# Patient Record
Sex: MALE | Race: BLACK OR AFRICAN AMERICAN | NOT HISPANIC OR LATINO | Employment: FULL TIME | URBAN - METROPOLITAN AREA
[De-identification: names, ages, dates, MRNs, and addresses within clinical notes are randomized per-mention and may not be internally consistent; named-entity substitution may affect disease eponyms.]

---

## 2017-07-03 ENCOUNTER — HOSPITAL ENCOUNTER (EMERGENCY)
Facility: HOSPITAL | Age: 52
Discharge: HOME | End: 2017-07-03
Attending: EMERGENCY MEDICINE | Admitting: EMERGENCY MEDICINE

## 2017-07-03 ENCOUNTER — APPOINTMENT (OUTPATIENT)
Dept: CT IMAGING | Facility: HOSPITAL | Age: 52
End: 2017-07-03

## 2017-07-03 VITALS
WEIGHT: 165 LBS | SYSTOLIC BLOOD PRESSURE: 138 MMHG | OXYGEN SATURATION: 99 % | RESPIRATION RATE: 20 BRPM | TEMPERATURE: 97.9 F | HEART RATE: 68 BPM | HEIGHT: 72 IN | BODY MASS INDEX: 22.35 KG/M2 | DIASTOLIC BLOOD PRESSURE: 88 MMHG

## 2017-07-03 DIAGNOSIS — H21.01 HYPHEMA, RIGHT: Primary | ICD-10-CM

## 2017-07-03 PROCEDURE — 99283 EMERGENCY DEPT VISIT LOW MDM: CPT

## 2017-07-03 PROCEDURE — 70480 CT ORBIT/EAR/FOSSA W/O DYE: CPT

## 2017-07-03 RX ORDER — HYDROCODONE BITARTRATE AND ACETAMINOPHEN 7.5; 325 MG/1; MG/1
1 TABLET ORAL ONCE
Status: COMPLETED | OUTPATIENT
Start: 2017-07-03 | End: 2017-07-03

## 2017-07-03 RX ADMIN — HYDROCODONE BITARTRATE AND ACETAMINOPHEN 1 TABLET: 7.5; 325 TABLET ORAL at 15:43

## 2017-07-03 NOTE — DISCHARGE INSTRUCTIONS
Do not eat or drink.  Keep shield on eye.  Have someone drive you immediately to Dr. Bolaños office at the above address.

## 2017-07-03 NOTE — ED PROVIDER NOTES
Subjective   HPI Comments: 52-year-old black male complaining of right eye injury.  Patient works for [employer] and was moving a box when something fell out of the box and struck him in the right eye.  He was wearing glasses at the time, but there was an intense force against his right eye.  Since that time he can see bright light as well as make out shapes and shadows but no other specific visual acuity.  Patient denies any other complaints, loss consciousness, neck pain, or other concerns.    Patient is a 52 y.o. male presenting with eye problem.   History provided by:  Patient  Eye Problem   Location:  Right eye  Quality:  Dull  Severity:  Moderate  Onset quality:  Gradual  Timing:  Constant  Progression:  Worsening  Chronicity:  New  Relieved by:  Nothing  Worsened by:  Nothing  Ineffective treatments:  None tried  Associated symptoms: blurred vision, decreased vision, swelling and tearing    Associated symptoms: no double vision and no headaches        Review of Systems   Eyes: Positive for blurred vision. Negative for double vision.   Neurological: Negative for headaches.   All other systems reviewed and are negative.      History reviewed. No pertinent past medical history.    No Known Allergies    Past Surgical History:   Procedure Laterality Date   • TOOTH EXTRACTION         History reviewed. No pertinent family history.    Social History     Social History   • Marital status: Single     Spouse name: N/A   • Number of children: N/A   • Years of education: N/A     Social History Main Topics   • Smoking status: Current Every Day Smoker   • Smokeless tobacco: None   • Alcohol use No   • Drug use: No   • Sexual activity: Not Asked     Other Topics Concern   • None     Social History Narrative   • None           Objective   Physical Exam   Constitutional: He appears well-developed and well-nourished.   HENT:   Head: Normocephalic.   Eyes:   PE RLA left eye.    Right eye:.  Orbital area reveals edema and abrasions with  erythema especially to the infraorbital portion.  No visible laceration was seen but multiple abrasions were seen.  As for the eye, he has an obvious hyphema and obliteration of the pupil.  When asked what he can see states he can see bright lights and the shape of my fingers but not clearly how many fingers I'm holding up.  His extraocular movements are grossly intact.  The remainder of his exam was normal.   Neck: Normal range of motion. Neck supple.   Pulmonary/Chest: Effort normal.   Neurological: He is alert.   Psychiatric: He has a normal mood and affect. His behavior is normal. Judgment and thought content normal.   Nursing note and vitals reviewed.      Procedures         ED Course  ED Course   Comment By Time   I spoke with Dr. Bolaños regarding this patient.  He states he will see the patient in the office.  A [place] shield was applied to his eye.  His sister will drive him to the office.Evaluation of his CT scan of his orbit was read by Dr. Salter revealing small foreign bodies and abrasions around the periorbital area as well as periorbital edema.  There was no skeletal fracture identified.  I spoke with Dr. Bolaños about these results and he is expecting to see this patient immediately in his office. LAYNE Parkinson 07/03 1645                  [facility]    Final diagnoses:   Hyphema, right            LAYNE Parkinson  07/03/17 1841